# Patient Record
Sex: FEMALE | Race: WHITE | NOT HISPANIC OR LATINO | URBAN - METROPOLITAN AREA
[De-identification: names, ages, dates, MRNs, and addresses within clinical notes are randomized per-mention and may not be internally consistent; named-entity substitution may affect disease eponyms.]

---

## 2024-02-13 ASSESSMENT — DERMATOLOGY QUALITY OF LIFE (QOL) ASSESSMENT
WHAT SINGLE SKIN CONDITION LISTED BELOW IS THE PATIENT ANSWERING THE QUALITY-OF-LIFE ASSESSMENT QUESTIONS ABOUT: ACNE
RATE HOW EMOTIONALLY BOTHERED YOU ARE BY YOUR SKIN PROBLEM (FOR EXAMPLE, WORRY, EMBARRASSMENT, FRUSTRATION): 3
RATE HOW BOTHERED YOU ARE BY EFFECTS OF YOUR SKIN PROBLEMS ON YOUR ACTIVITIES (EG, GOING OUT, ACCOMPLISHING WHAT YOU WANT, WORK ACTIVITIES OR YOUR RELATIONSHIPS WITH OTHERS): 4
RATE HOW BOTHERED YOU ARE BY EFFECTS OF YOUR SKIN PROBLEMS ON YOUR ACTIVITIES (EG, GOING OUT, ACCOMPLISHING WHAT YOU WANT, WORK ACTIVITIES OR YOUR RELATIONSHIPS WITH OTHERS): 4
WHAT SINGLE SKIN CONDITION LISTED BELOW IS THE PATIENT ANSWERING THE QUALITY-OF-LIFE ASSESSMENT QUESTIONS ABOUT: ACNE
RATE HOW BOTHERED YOU ARE BY SYMPTOMS OF YOUR SKIN PROBLEM (EG, ITCHING, STINGING BURNING, HURTING OR SKIN IRRITATION): 3
RATE HOW BOTHERED YOU ARE BY SYMPTOMS OF YOUR SKIN PROBLEM (EG, ITCHING, STINGING BURNING, HURTING OR SKIN IRRITATION): 3
RATE HOW EMOTIONALLY BOTHERED YOU ARE BY YOUR SKIN PROBLEM (FOR EXAMPLE, WORRY, EMBARRASSMENT, FRUSTRATION): 3

## 2024-02-14 ENCOUNTER — OFFICE VISIT (OUTPATIENT)
Dept: DERMATOLOGY | Facility: CLINIC | Age: 24
End: 2024-02-14
Payer: COMMERCIAL

## 2024-02-14 DIAGNOSIS — L70.0 ACNE VULGARIS: Primary | ICD-10-CM

## 2024-02-14 PROCEDURE — 99214 OFFICE O/P EST MOD 30 MIN: CPT | Performed by: DERMATOLOGY

## 2024-02-14 PROCEDURE — 1036F TOBACCO NON-USER: CPT | Performed by: DERMATOLOGY

## 2024-02-14 RX ORDER — CLINDAMYCIN PHOSPHATE 10 UG/ML
LOTION TOPICAL DAILY
Qty: 60 ML | Refills: 11 | Status: SHIPPED | OUTPATIENT
Start: 2024-02-14 | End: 2025-02-13

## 2024-02-14 RX ORDER — TRETINOIN 0.25 MG/G
CREAM TOPICAL NIGHTLY
Qty: 45 G | Refills: 3 | Status: SHIPPED | OUTPATIENT
Start: 2024-02-14 | End: 2025-02-13

## 2024-02-14 ASSESSMENT — DERMATOLOGY QUALITY OF LIFE (QOL) ASSESSMENT
RATE HOW EMOTIONALLY BOTHERED YOU ARE BY YOUR SKIN PROBLEM (FOR EXAMPLE, WORRY, EMBARRASSMENT, FRUSTRATION): 1
ARE THERE EXCLUSIONS OR EXCEPTIONS FOR THE QUALITY OF LIFE ASSESSMENT: NO
DATE THE QUALITY-OF-LIFE ASSESSMENT WAS COMPLETED: 66884
RATE HOW BOTHERED YOU ARE BY SYMPTOMS OF YOUR SKIN PROBLEM (EG, ITCHING, STINGING BURNING, HURTING OR SKIN IRRITATION): 2
RATE HOW BOTHERED YOU ARE BY EFFECTS OF YOUR SKIN PROBLEMS ON YOUR ACTIVITIES (EG, GOING OUT, ACCOMPLISHING WHAT YOU WANT, WORK ACTIVITIES OR YOUR RELATIONSHIPS WITH OTHERS): 1
WHAT SINGLE SKIN CONDITION LISTED BELOW IS THE PATIENT ANSWERING THE QUALITY-OF-LIFE ASSESSMENT QUESTIONS ABOUT: ACNE

## 2024-02-14 ASSESSMENT — DERMATOLOGY PATIENT ASSESSMENT
HAVE YOU HAD OR DO YOU HAVE VASCULAR DISEASE: NO
DO YOU USE SUNSCREEN: DAILY
ARE YOU AN ORGAN TRANSPLANT RECIPIENT: NO
DO YOU USE A TANNING BED: NO
ARE YOU TRYING TO GET PREGNANT: NO
HAVE YOU HAD OR DO YOU HAVE A STAPH INFECTION: NO
DO YOU HAVE IRREGULAR MENSTRUAL CYCLES: NO
ARE YOU ON BIRTH CONTROL: NO
DO YOU HAVE ANY NEW OR CHANGING LESIONS: NO

## 2024-02-14 ASSESSMENT — PATIENT GLOBAL ASSESSMENT (PGA): PATIENT GLOBAL ASSESSMENT: PATIENT GLOBAL ASSESSMENT:  1 - CLEAR

## 2024-02-14 ASSESSMENT — ITCH NUMERIC RATING SCALE: HOW SEVERE IS YOUR ITCHING?: 0

## 2024-02-14 NOTE — PROGRESS NOTES
Subjective     Loreta Blum is a 23 y.o. female who presents for the following: Acne.     Previously on OCP but does not remember if it made a big difference with her acne and it caused side effects like excessive crying. She is currently using Cerave wash and retinol and moisturizer.    LOV 2021 with me, skin check at that time    Review of Systems:  No other skin or systemic complaints other than what is documented elsewhere in the note.    The following portions of the chart were reviewed this encounter and updated as appropriate:       Specialty Problems    None    Past Medical History:  Loreta Blum  has no past medical history on file.    Past Surgical History:  Loreta Blum  has no past surgical history on file.    Family History:  Patient family history is not on file.    Social History:  Loreta Blum  reports that she has never smoked. She has never used smokeless tobacco. No history on file for alcohol use and drug use.    Allergies:  Patient has no known allergies.    Current Medications / CAM's:    Current Outpatient Medications:     clindamycin (Cleocin T) 1 % lotion, Apply topically once daily. 60g, Disp: 60 mL, Rfl: 11    tretinoin (Retin-A) 0.025 % cream, Apply topically once daily at bedtime., Disp: 45 g, Rfl: 3     Objective   Well appearing patient in no apparent distress; mood and affect are within normal limits.    A full examination was performed including scalp, head, eyes, ears, nose, lips, neck, chest, axillae, abdomen, back, buttocks, bilateral upper extremities, bilateral lower extremities, hands, feet, fingers, toes, fingernails, and toenails. All findings within normal limits unless otherwise noted below.    Head - Anterior (Face)  Scattered inflammatory papulopustules and few open comedones on chin, cheeks and forehead         Assessment/Plan   Acne vulgaris  Head - Anterior (Face)    - Mild to moderate inflammatory and hormonal acne  - Patient previously on OCP and is  unsure whether this helped her acne but also caused side effects such as excessive crying and would not like to restart  - Discussed topical treatments today. Discussed avoiding pregnancy, particularly with tretinoin cream. Patient does not plan on becoming pregnant and has one same sex partner.  - Start benzoyl peroxide 4% wash OTC every morning; this can bleach!  - Start clindamycin 1% lotion every morning  - Start tretinoin 0.025% cream every evening to clean dry skin. - Start Tretinoin. Apply thin layer ot tretinoin 0.025% cream to face every other night, increase to nightly if not too dry. Avoid with pregnancy. Skip nights and use noncomedogenic moisturizer if too drying.  - Continue gentle facial cleanser in PM and noncomedogenic moisturizer in the morning with SPF and without SPF in the evening  - Follow up 3-4 months    Related Procedures  Follow Up In Dermatology - Established Patient    Related Medications  tretinoin (Retin-A) 0.025 % cream  Apply topically once daily at bedtime.    clindamycin (Cleocin T) 1 % lotion  Apply topically once daily. 60g         Niki Gamino MD    I saw and evaluated the patient. I personally obtained the key and critical portions of the history and physical exam or was physically present for key and critical portions performed by the resident/fellow. I reviewed the resident/fellow's documentation and discussed the patient with the resident/fellow. I agree with the resident/fellow's medical decision making as documented in the note.    Leah Bull MD

## 2024-02-14 NOTE — PATIENT INSTRUCTIONS
YOUR TOPICAL ACNE TREATMENT PLAN    Morning:    Wash face/body with:    Gentle, non-medicated wash (Examples: Cetaphil, CeraVe, Neutrogena UltraGentle)  Benzoyl peroxide  4-5% (Examples: PanOxyl, Clean&Clear) - this can bleach! Use an old towel or a white towel to dry off carefully  Clindamycin lotion (prescription)       2. Moisturize:  If dry, apply non-scented, non-comedogenic moisturizer of your choice to affected areas. Picking a moisturizer with SPF 30+ to use in the morning will help dark marks fade faster. Examples: Neutrogena, Cetaphil, CeraVe, EltaMD (online)        Evening:    Wash face with:    Gentle, non-medicated wash (Examples: Cetaphil, CeraVe, Neutrogena UltraGentle)  Tretinoin cream (prescription)- apply small pea-sized amount to entire face       2. Moisturize:  If dry, apply non-scented, non-comedogenic moisturizer of your choice to affected areas. Examples: Neutrogena, Cetaphil, CeraVe, EltaMD (online)      When applying topical medications to the face, use the “5-dot” method. Take a small pea-sized amount and place dots in each of 5 locations of your face: mid-forehead, each cheek, nose, and chin. Then rub in. You should not see a “film” of the medication on your skin; if you do, you're probably using too much.    Topical medications may lead to dryness where you use them. This almost always improves as your skin gets used to the medication (about 2-3 weeks). Some tips to get you through this time include waiting 15-20 minutes after washing before applying the topical medication and starting out with use every 2-3 days, gradually working up to “every day” use.

## 2024-05-24 ENCOUNTER — APPOINTMENT (OUTPATIENT)
Dept: DERMATOLOGY | Facility: CLINIC | Age: 24
End: 2024-05-24
Payer: COMMERCIAL

## 2024-11-25 PROBLEM — K62.1 RECTAL POLYP: Status: ACTIVE | Noted: 2024-11-25

## 2024-12-30 ENCOUNTER — OFFICE (OUTPATIENT)
Dept: URBAN - METROPOLITAN AREA CLINIC 26 | Facility: CLINIC | Age: 24
End: 2024-12-30
Payer: COMMERCIAL

## 2024-12-30 VITALS
TEMPERATURE: 98.1 F | SYSTOLIC BLOOD PRESSURE: 112 MMHG | DIASTOLIC BLOOD PRESSURE: 75 MMHG | HEIGHT: 66 IN | HEART RATE: 77 BPM | WEIGHT: 214 LBS

## 2024-12-30 DIAGNOSIS — K21.9 GASTRO-ESOPHAGEAL REFLUX DISEASE WITHOUT ESOPHAGITIS: ICD-10-CM

## 2024-12-30 DIAGNOSIS — R19.4 CHANGE IN BOWEL HABIT: ICD-10-CM

## 2024-12-30 DIAGNOSIS — Z83.719 FAMILY HISTORY OF COLON POLYPS, UNSPECIFIED: ICD-10-CM

## 2024-12-30 PROCEDURE — 99213 OFFICE O/P EST LOW 20 MIN: CPT | Performed by: NURSE PRACTITIONER

## 2024-12-30 RX ORDER — OMEPRAZOLE 40 MG/1
40 CAPSULE, DELAYED RELEASE ORAL
Qty: 90 | Refills: 1 | Status: ACTIVE
Start: 2024-12-30

## 2025-02-04 ENCOUNTER — OFFICE (OUTPATIENT)
Dept: URBAN - METROPOLITAN AREA CLINIC 26 | Facility: CLINIC | Age: 25
End: 2025-02-04
Payer: COMMERCIAL

## 2025-02-04 VITALS
SYSTOLIC BLOOD PRESSURE: 119 MMHG | HEART RATE: 70 BPM | DIASTOLIC BLOOD PRESSURE: 82 MMHG | HEIGHT: 66 IN | WEIGHT: 220 LBS | TEMPERATURE: 97.6 F

## 2025-02-04 DIAGNOSIS — R19.4 CHANGE IN BOWEL HABIT: ICD-10-CM

## 2025-02-04 DIAGNOSIS — Z83.719 FAMILY HISTORY OF COLON POLYPS, UNSPECIFIED: ICD-10-CM

## 2025-02-04 DIAGNOSIS — K21.9 GASTRO-ESOPHAGEAL REFLUX DISEASE WITHOUT ESOPHAGITIS: ICD-10-CM

## 2025-02-04 PROCEDURE — 99213 OFFICE O/P EST LOW 20 MIN: CPT | Performed by: NURSE PRACTITIONER
